# Patient Record
Sex: MALE | Race: WHITE | NOT HISPANIC OR LATINO | URBAN - METROPOLITAN AREA
[De-identification: names, ages, dates, MRNs, and addresses within clinical notes are randomized per-mention and may not be internally consistent; named-entity substitution may affect disease eponyms.]

---

## 2020-11-13 ENCOUNTER — WEB ENCOUNTER (OUTPATIENT)
Dept: URBAN - METROPOLITAN AREA CLINIC 35 | Facility: CLINIC | Age: 75
End: 2020-11-13

## 2020-11-16 ENCOUNTER — OFFICE VISIT (OUTPATIENT)
Dept: URBAN - METROPOLITAN AREA CLINIC 33 | Facility: CLINIC | Age: 75
End: 2020-11-16

## 2020-11-16 VITALS
WEIGHT: 200 LBS | SYSTOLIC BLOOD PRESSURE: 115 MMHG | DIASTOLIC BLOOD PRESSURE: 65 MMHG | BODY MASS INDEX: 24.87 KG/M2 | HEIGHT: 75 IN

## 2020-11-16 PROBLEM — 428283002 HISTORY OF POLYP OF COLON: Status: ACTIVE | Noted: 2020-11-16

## 2020-11-16 RX ORDER — FINASTERIDE 5 MG/1
1 TABLET TABLET, FILM COATED ORAL ONCE A DAY
Qty: 30 | Status: ACTIVE | COMMUNITY

## 2020-11-16 RX ORDER — AMLODIPINE BESYLATE 5 MG/1
1 TABLET TABLET ORAL ONCE A DAY
Qty: 30 | Status: ACTIVE | COMMUNITY

## 2020-11-16 RX ORDER — POLYETHYLENE GLYCOL 3350, SODIUM SULFATE ANHYDROUS, SODIUM BICARBONATE, SODIUM CHLORIDE, POTASSIUM CHLORIDE 236; 22.74; 6.74; 5.86; 2.97 G/4L; G/4L; G/4L; G/4L; G/4L
AS DIRECTED POWDER, FOR SOLUTION ORAL
Qty: 1 KIT | Refills: 0 | OUTPATIENT
Start: 2020-11-16

## 2020-11-16 RX ORDER — MELOXICAM 15 MG/1
1 TABLET TABLET ORAL ONCE A DAY
Qty: 30 | Status: ACTIVE | COMMUNITY

## 2020-11-16 RX ORDER — MULTIVITAMIN
1 TABLET TABLET ORAL ONCE A DAY
Qty: 30 | Status: ACTIVE | COMMUNITY

## 2020-11-16 RX ORDER — TAMSULOSIN HYDROCHLORIDE 0.4 MG/1
1 CAPSULE CAPSULE ORAL ONCE A DAY
Qty: 30 | Status: ACTIVE | COMMUNITY

## 2020-11-16 RX ORDER — ATORVASTATIN CALCIUM 20 MG/1
1 TABLET TABLET, FILM COATED ORAL ONCE A DAY
Qty: 30 | Status: ACTIVE | COMMUNITY

## 2020-11-16 NOTE — HPI-MIGRATED HPI
;     Surveillance Colonoscopy : 76 y/o male patient presents today for a consultation for a surveillance colonoscopy. Patient currently admits 1 formed bowel movements per day with no melena, mucus  or blood in stools.  Patient denies abdominal pain, bloating, gas, or  heartburn.  Patient admits having a colonoscopy in 2014 with normal findings. During the third of four past colonoscopies three (3) polyps were removed. Frequency of colonoscopy was reduced from 5 to 3 years.  Upon no findings on fourth colonoscopy, frequency changed back to 5. Patient admits that he was dianosed with diverticultitis.   Patient admits a family hx of colon, gastric, or esophageal cancer/polyps.  CT abd/pelvis (03/2019) showed atrophy of pancreas.   ;

## 2020-11-25 ENCOUNTER — OFFICE VISIT (OUTPATIENT)
Dept: URBAN - METROPOLITAN AREA SURGERY CENTER 8 | Facility: SURGERY CENTER | Age: 75
End: 2020-11-25

## 2020-12-02 ENCOUNTER — TELEPHONE ENCOUNTER (OUTPATIENT)
Dept: URBAN - METROPOLITAN AREA CLINIC 35 | Facility: CLINIC | Age: 75
End: 2020-12-02

## 2020-12-10 ENCOUNTER — OFFICE VISIT (OUTPATIENT)
Dept: URBAN - METROPOLITAN AREA CLINIC 35 | Facility: CLINIC | Age: 75
End: 2020-12-10

## 2020-12-10 VITALS
HEIGHT: 75 IN | BODY MASS INDEX: 25.24 KG/M2 | SYSTOLIC BLOOD PRESSURE: 138 MMHG | DIASTOLIC BLOOD PRESSURE: 80 MMHG | WEIGHT: 203 LBS

## 2020-12-10 PROBLEM — 398050005 DIVERTICULAR DISEASE OF COLON: Status: ACTIVE | Noted: 2020-12-10

## 2020-12-10 RX ORDER — MULTIVITAMIN
1 TABLET TABLET ORAL ONCE A DAY
Qty: 30 | Status: ACTIVE | COMMUNITY

## 2020-12-10 RX ORDER — MELOXICAM 15 MG/1
1 TABLET TABLET ORAL ONCE A DAY
Qty: 30 | Status: ACTIVE | COMMUNITY

## 2020-12-10 RX ORDER — TAMSULOSIN HYDROCHLORIDE 0.4 MG/1
1 CAPSULE CAPSULE ORAL ONCE A DAY
Qty: 30 | Status: ACTIVE | COMMUNITY

## 2020-12-10 RX ORDER — POLYETHYLENE GLYCOL 3350, SODIUM SULFATE ANHYDROUS, SODIUM BICARBONATE, SODIUM CHLORIDE, POTASSIUM CHLORIDE 236; 22.74; 6.74; 5.86; 2.97 G/4L; G/4L; G/4L; G/4L; G/4L
AS DIRECTED POWDER, FOR SOLUTION ORAL
Qty: 1 KIT | Refills: 0 | Status: ON HOLD | COMMUNITY
Start: 2020-11-16

## 2020-12-10 RX ORDER — ATORVASTATIN CALCIUM 20 MG/1
1 TABLET TABLET, FILM COATED ORAL ONCE A DAY
Qty: 30 | Status: ACTIVE | COMMUNITY

## 2020-12-10 RX ORDER — FINASTERIDE 5 MG/1
1 TABLET TABLET, FILM COATED ORAL ONCE A DAY
Qty: 30 | Status: ACTIVE | COMMUNITY

## 2020-12-10 RX ORDER — AMLODIPINE BESYLATE 5 MG/1
1 TABLET TABLET ORAL ONCE A DAY
Qty: 30 | Status: ACTIVE | COMMUNITY

## 2020-12-10 NOTE — HPI-MIGRATED HPI
;   ;     Follow Up : Patient presents today for a follow up regardin his US results.  Patient currently admits 1 formed bowel movements per day with no melena, mucus  or blood in stools.  Patient denies abdominal pain, bloating, gas, or  heartburn.   US Abd report (11/24/2020) shows: 1. Fatty liver with echogenic gallstone but no sonographic evidence for acute cholecystitis.  2. Mild bilateral renal hydronephrosis with 1.4 cm cystic focus within the interpolar region of the right kidney versus dilated calyx.    Colonoscopy report (11/25/2020) shows: Diverticulosis in the sigmoid colon and in the descending colon.  No specimens collected. ;   Surveillance Colonoscopy :     Last visit  (11/16/2020) 76 y/o male patient presents today for a consultation for a surveillance colonoscopy. Patient currently admits 1 formed bowel movements per day with no melena, mucus  or blood in stools.  Patient denies abdominal pain, bloating, gas, or  heartburn.  Patient admits having a colonoscopy in 2014 with normal findings. During the third of four past colonoscopies three (3) polyps were removed. Frequency of colonoscopy was reduced from 5 to 3 years.  Upon no findings on fourth colonoscopy, frequency changed back to 5. Patient admits that he was dianosed with diverticultitis.   Patient admits a family hx of colon, gastric, or esophageal cancer/polyps.  CT abd/pelvis (03/2019) showed atrophy of pancreas.   ;

## 2020-12-10 NOTE — HPI-MIGRATED HPI
;   ;     Colonoscopy Follow-Up : Patient presents today for follow up to his/her colonoscopy which was completed on (11/25/2020) by Dr. Juan Parks.  Patient admits/denies any complications after his/her procedure. Patient currently admits __ formed bowel movements per day.  Patient denies any melena, blood or mucus in stools.Patient denies any associated abdominal pain, bloating, or gas.   Colonoscopy report shows: ;   Surveillance Colonoscopy :    Last visit  (11/16/2020) 74 y/o male patient presents today for a consultation for a surveillance colonoscopy. Patient currently admits 1 formed bowel movements per day with no melena, mucus  or blood in stools.  Patient denies abdominal pain, bloating, gas, or  heartburn.  Patient admits having a colonoscopy in 2014 with normal findings. During the third of four past colonoscopies three (3) polyps were removed. Frequency of colonoscopy was reduced from 5 to 3 years.  Upon no findings on fourth colonoscopy, frequency changed back to 5. Patient admits that he was dianosed with diverticultitis.   Patient admits a family hx of colon, gastric, or esophageal cancer/polyps.  CT abd/pelvis (03/2019) showed atrophy of pancreas.   ;

## 2021-07-08 ENCOUNTER — OFFICE VISIT (OUTPATIENT)
Dept: URBAN - METROPOLITAN AREA CLINIC 35 | Facility: CLINIC | Age: 76
End: 2021-07-08

## 2021-07-08 ENCOUNTER — TELEPHONE ENCOUNTER (OUTPATIENT)
Dept: URBAN - METROPOLITAN AREA CLINIC 35 | Facility: CLINIC | Age: 76
End: 2021-07-08

## 2021-07-08 VITALS
WEIGHT: 194 LBS | HEIGHT: 75 IN | HEART RATE: 103 BPM | BODY MASS INDEX: 24.12 KG/M2 | OXYGEN SATURATION: 97 % | SYSTOLIC BLOOD PRESSURE: 120 MMHG | DIASTOLIC BLOOD PRESSURE: 68 MMHG

## 2021-07-08 RX ORDER — FINASTERIDE 5 MG/1
1 TABLET TABLET, FILM COATED ORAL ONCE A DAY
Qty: 30 | Status: ACTIVE | COMMUNITY

## 2021-07-08 RX ORDER — POLYETHYLENE GLYCOL 3350, SODIUM SULFATE ANHYDROUS, SODIUM BICARBONATE, SODIUM CHLORIDE, POTASSIUM CHLORIDE 236; 22.74; 6.74; 5.86; 2.97 G/4L; G/4L; G/4L; G/4L; G/4L
AS DIRECTED POWDER, FOR SOLUTION ORAL
Qty: 1 KIT | Refills: 0 | Status: ON HOLD | COMMUNITY
Start: 2020-11-16

## 2021-07-08 RX ORDER — MELOXICAM 15 MG/1
1 TABLET TABLET ORAL ONCE A DAY
Qty: 30 | Status: ACTIVE | COMMUNITY

## 2021-07-08 RX ORDER — TAMSULOSIN HYDROCHLORIDE 0.4 MG/1
1 CAPSULE CAPSULE ORAL ONCE A DAY
Qty: 30 | Status: ACTIVE | COMMUNITY

## 2021-07-08 RX ORDER — ATORVASTATIN CALCIUM 20 MG/1
1 TABLET TABLET, FILM COATED ORAL ONCE A DAY
Qty: 30 | Status: ACTIVE | COMMUNITY

## 2021-07-08 RX ORDER — MULTIVITAMIN
1 TABLET TABLET ORAL ONCE A DAY
Qty: 30 | Status: ACTIVE | COMMUNITY

## 2021-07-08 RX ORDER — AMLODIPINE BESYLATE 5 MG/1
1 TABLET TABLET ORAL ONCE A DAY
Qty: 30 | Status: ACTIVE | COMMUNITY

## 2021-07-08 NOTE — HPI-MIGRATED HPI
;   ;   ;     Colonoscopy Follow-Up :      (12/10/2020) Patient presents today for follow up to his colonoscopy which was completed on (11/25/2020) by Dr. Juan Parks.  Patient denies any complications after his procedure.  Patient currently admits 1 formed bowel movements per day with no melena, mucus  or blood in stools.  Patient denies abdominal pain, bloating, gas, or  heartburn.  ;   Surveillance Colonoscopy :        Last visit  (11/16/2020) 74 y/o male patient presents today for a consultation for a surveillance colonoscopy. Patient currently admits 1 formed bowel movements per day with no melena, mucus  or blood in stools.  Patient denies abdominal pain, bloating, gas, or  heartburn.  Patient admits having a colonoscopy in 2014 with normal findings. During the third of four past colonoscopies three (3) polyps were removed. Frequency of colonoscopy was reduced from 5 to 3 years.  Upon no findings on fourth colonoscopy, frequency changed back to 5. Patient admits that he was dianosed with diverticultitis.   Patient admits a family hx of colon, gastric, or esophageal cancer/polyps.  CT abd/pelvis (03/2019) showed atrophy of pancreas.;   Anemia : 75 y/o male patient presents today for a consultation regarding recent onset of anemia. Patient was first diagnosed after he had a hip surgery in (12/2020), and the most recent labs were taken  (06/15/2021).  Patient currently states that he is taking any iron supplements (ferrous sulfate) since (05/14/2021) and that they have/have not had a transfusion.  Patient denies  a history of GI bleeding, any blood or melena in stools, cold extremities, light headedness, dizziness.  Patient admits occasional usage of NSAIDs, epigastric/abdominal pains when laying down, and occasional fatigue  Patient admits 1 formed bowel movements without mucus, blood, or melena in stools. Patient does admit occasional 5-6 coffee ground like specs in stools.  Patient denies past EGD performed.  Last labs were done on (06/15/2021) Hgb L 12.4 MCH L 26.7 MCHC L 31.0 RDW H 21.6 Ferritin L 19 Iron saturation L 16 ;

## 2021-07-13 ENCOUNTER — OFFICE VISIT (OUTPATIENT)
Dept: URBAN - METROPOLITAN AREA SURGERY CENTER 8 | Facility: SURGERY CENTER | Age: 76
End: 2021-07-13

## 2021-07-22 ENCOUNTER — OFFICE VISIT (OUTPATIENT)
Dept: URBAN - METROPOLITAN AREA CLINIC 35 | Facility: CLINIC | Age: 76
End: 2021-07-22

## 2021-07-29 ENCOUNTER — TELEPHONE ENCOUNTER (OUTPATIENT)
Dept: URBAN - METROPOLITAN AREA CLINIC 35 | Facility: CLINIC | Age: 76
End: 2021-07-29

## 2021-07-29 ENCOUNTER — OFFICE VISIT (OUTPATIENT)
Dept: URBAN - METROPOLITAN AREA CLINIC 35 | Facility: CLINIC | Age: 76
End: 2021-07-29

## 2021-07-29 VITALS
HEIGHT: 75 IN | SYSTOLIC BLOOD PRESSURE: 135 MMHG | HEART RATE: 96 BPM | OXYGEN SATURATION: 96 % | BODY MASS INDEX: 24.25 KG/M2 | WEIGHT: 195 LBS | DIASTOLIC BLOOD PRESSURE: 80 MMHG

## 2021-07-29 PROBLEM — 87522002 IRON DEFICIENCY ANEMIA: Status: ACTIVE | Noted: 2021-07-09

## 2021-07-29 RX ORDER — ATORVASTATIN CALCIUM 20 MG/1
1 TABLET TABLET, FILM COATED ORAL ONCE A DAY
Qty: 30 | Status: ACTIVE | COMMUNITY

## 2021-07-29 RX ORDER — MULTIVITAMIN
1 TABLET TABLET ORAL ONCE A DAY
Qty: 30 | Status: ACTIVE | COMMUNITY

## 2021-07-29 RX ORDER — AMLODIPINE BESYLATE 5 MG/1
1 TABLET TABLET ORAL ONCE A DAY
Qty: 30 | Status: ACTIVE | COMMUNITY

## 2021-07-29 RX ORDER — POLYETHYLENE GLYCOL 3350, SODIUM SULFATE ANHYDROUS, SODIUM BICARBONATE, SODIUM CHLORIDE, POTASSIUM CHLORIDE 236; 22.74; 6.74; 5.86; 2.97 G/4L; G/4L; G/4L; G/4L; G/4L
AS DIRECTED POWDER, FOR SOLUTION ORAL
Qty: 1 KIT | Refills: 0 | Status: ON HOLD | COMMUNITY
Start: 2020-11-16

## 2021-07-29 RX ORDER — TAMSULOSIN HYDROCHLORIDE 0.4 MG/1
1 CAPSULE CAPSULE ORAL ONCE A DAY
Qty: 30 | Status: ACTIVE | COMMUNITY

## 2021-07-29 RX ORDER — FINASTERIDE 5 MG/1
1 TABLET TABLET, FILM COATED ORAL ONCE A DAY
Qty: 30 | Status: ACTIVE | COMMUNITY

## 2021-07-29 RX ORDER — MELOXICAM 15 MG/1
1 TABLET TABLET ORAL ONCE A DAY
Qty: 30 | Status: ACTIVE | COMMUNITY

## 2021-07-29 NOTE — HPI-MIGRATED HPI
;   ;   ;   ;     Follow up- EGD : Patient presents today for follow up to his  EGD which was completed on   (07/13/2021)  by Dr. Juan Parks.  Patient denies any complications after his procedure. Since the procedure patient denies dysphagia, heartburn, globus, changes in appetite, abdominal/epigastric pain or changes in bowel habits.   Patient states that he has been trying to be on a vegetarian diet.   EGD report shows:  1. Erythematous mucosa in the antrum and gastric body. Biopsied.  2. Erythematous mucosa in the esophagus. Biopsied.  3. 2 cm hiatal hernia.;   Colonoscopy Follow-Up : Last visit  (12/10/2020) Patient presents today for follow up to his colonoscopy which was completed on (11/25/2020) by Dr. Juan Parks.  Patient denies any complications after his procedure.  Patient currently admits 1 formed bowel movements per day with no melena, mucus  or blood in stools.  Patient denies abdominal pain, bloating, gas, or  heartburn.;   Surveillance Colonoscopy : Last visit  (11/16/2020) 76 y/o male patient presents today for a consultation for a surveillance colonoscopy. Patient currently admits 1 formed bowel movements per day with no melena, mucus  or blood in stools.  Patient denies abdominal pain, bloating, gas, or  heartburn.  Patient admits having a colonoscopy in 2014 with normal findings. During the third of four past colonoscopies three (3) polyps were removed. Frequency of colonoscopy was reduced from 5 to 3 years.  Upon no findings on fourth colonoscopy, frequency changed back to 5. Patient admits that he was dianosed with diverticultitis.   Patient admits a family hx of colon, gastric, or esophageal cancer/polyps.  CT abd/pelvis (03/2019) showed atrophy of pancreas.;   Anemia : Patient presents for a follow up of his anemia. Patient no longer experiences epigastric/abdominal pains. Patient denies any blood or melena in stools, cold extremities, light headedness, dizziness.    Patient continues to take iron supplements and states that he has been feeling more more energetic , less fatigue.  Patient states that he has been trying to be on a vegetarian diet.      Last visit (07/08/2021) 77 y/o male patient presents today for a consultation regarding recent onset of anemia. Patient was first diagnosed after he had a hip surgery in (12/2020), and the most recent labs were taken  (06/15/2021).  Patient currently states that he is taking any iron supplements (ferrous sulfate) since (05/14/2021) and that they have/have not had a transfusion.  Patient denies  a history of GI bleeding, any blood or melena in stools, cold extremities, light headedness, dizziness.  Patient admits occasional usage of NSAIDs, epigastric/abdominal pains when laying down, and occasional fatigue  Patient admits 1 formed bowel movements without mucus, blood, or melena in stools. Patient does admit occasional 5-6 coffee ground like specs in stools.  Patient denies past EGD performed.  Last labs were done on (06/15/2021) Hgb L 12.4 MCH L 26.7 MCHC L 31.0 RDW H 21.6 Ferritin L 19 Iron saturation L 16;

## 2021-07-30 ENCOUNTER — TELEPHONE ENCOUNTER (OUTPATIENT)
Dept: URBAN - METROPOLITAN AREA CLINIC 35 | Facility: CLINIC | Age: 76
End: 2021-07-30

## 2021-08-12 ENCOUNTER — OFFICE VISIT (OUTPATIENT)
Dept: URBAN - METROPOLITAN AREA CLINIC 35 | Facility: CLINIC | Age: 76
End: 2021-08-12

## 2021-08-20 ENCOUNTER — TELEPHONE ENCOUNTER (OUTPATIENT)
Dept: URBAN - METROPOLITAN AREA CLINIC 35 | Facility: CLINIC | Age: 76
End: 2021-08-20

## 2021-08-25 ENCOUNTER — OFFICE VISIT (OUTPATIENT)
Dept: URBAN - METROPOLITAN AREA CLINIC 35 | Facility: CLINIC | Age: 76
End: 2021-08-25

## 2021-08-25 ENCOUNTER — OFFICE VISIT (OUTPATIENT)
Dept: URBAN - METROPOLITAN AREA CLINIC 31 | Facility: CLINIC | Age: 76
End: 2021-08-25

## 2021-08-26 ENCOUNTER — OFFICE VISIT (OUTPATIENT)
Dept: URBAN - METROPOLITAN AREA CLINIC 35 | Facility: CLINIC | Age: 76
End: 2021-08-26

## 2021-08-31 ENCOUNTER — OFFICE VISIT (OUTPATIENT)
Dept: URBAN - METROPOLITAN AREA CLINIC 31 | Facility: CLINIC | Age: 76
End: 2021-08-31

## 2021-08-31 RX ORDER — AMLODIPINE BESYLATE 5 MG/1
1 TABLET TABLET ORAL ONCE A DAY
Qty: 30 | Status: ACTIVE | COMMUNITY

## 2021-08-31 RX ORDER — MULTIVITAMIN
1 TABLET TABLET ORAL ONCE A DAY
Qty: 30 | Status: ACTIVE | COMMUNITY

## 2021-08-31 RX ORDER — ATORVASTATIN CALCIUM 20 MG/1
1 TABLET TABLET, FILM COATED ORAL ONCE A DAY
Qty: 30 | Status: ACTIVE | COMMUNITY

## 2021-08-31 RX ORDER — POLYETHYLENE GLYCOL 3350, SODIUM SULFATE ANHYDROUS, SODIUM BICARBONATE, SODIUM CHLORIDE, POTASSIUM CHLORIDE 236; 22.74; 6.74; 5.86; 2.97 G/4L; G/4L; G/4L; G/4L; G/4L
AS DIRECTED POWDER, FOR SOLUTION ORAL
Qty: 1 KIT | Refills: 0 | Status: ON HOLD | COMMUNITY
Start: 2020-11-16

## 2021-08-31 RX ORDER — TAMSULOSIN HYDROCHLORIDE 0.4 MG/1
1 CAPSULE CAPSULE ORAL ONCE A DAY
Qty: 30 | Status: ACTIVE | COMMUNITY

## 2021-08-31 RX ORDER — MELOXICAM 15 MG/1
1 TABLET TABLET ORAL ONCE A DAY
Qty: 30 | Status: ACTIVE | COMMUNITY

## 2021-08-31 RX ORDER — FINASTERIDE 5 MG/1
1 TABLET TABLET, FILM COATED ORAL ONCE A DAY
Qty: 30 | Status: ACTIVE | COMMUNITY

## 2021-09-02 ENCOUNTER — OFFICE VISIT (OUTPATIENT)
Dept: URBAN - METROPOLITAN AREA CLINIC 35 | Facility: CLINIC | Age: 76
End: 2021-09-02

## 2021-09-06 ENCOUNTER — TELEPHONE ENCOUNTER (OUTPATIENT)
Dept: URBAN - METROPOLITAN AREA CLINIC 35 | Facility: CLINIC | Age: 76
End: 2021-09-06

## 2021-09-07 ENCOUNTER — TELEPHONE ENCOUNTER (OUTPATIENT)
Dept: URBAN - METROPOLITAN AREA CLINIC 35 | Facility: CLINIC | Age: 76
End: 2021-09-07

## 2021-09-09 ENCOUNTER — TELEPHONE ENCOUNTER (OUTPATIENT)
Dept: URBAN - METROPOLITAN AREA CLINIC 35 | Facility: CLINIC | Age: 76
End: 2021-09-09

## 2021-09-09 ENCOUNTER — OFFICE VISIT (OUTPATIENT)
Dept: URBAN - METROPOLITAN AREA CLINIC 35 | Facility: CLINIC | Age: 76
End: 2021-09-09

## 2021-09-09 VITALS
BODY MASS INDEX: 24.49 KG/M2 | DIASTOLIC BLOOD PRESSURE: 74 MMHG | SYSTOLIC BLOOD PRESSURE: 110 MMHG | OXYGEN SATURATION: 98 % | WEIGHT: 197 LBS | HEIGHT: 75 IN | HEART RATE: 95 BPM

## 2021-09-09 RX ORDER — ATORVASTATIN CALCIUM 20 MG/1
1 TABLET TABLET, FILM COATED ORAL ONCE A DAY
Qty: 30 | Status: ACTIVE | COMMUNITY

## 2021-09-09 RX ORDER — TAMSULOSIN HYDROCHLORIDE 0.4 MG/1
1 CAPSULE CAPSULE ORAL ONCE A DAY
Qty: 30 | Status: ACTIVE | COMMUNITY

## 2021-09-09 RX ORDER — AMLODIPINE BESYLATE 5 MG/1
1 TABLET TABLET ORAL ONCE A DAY
Qty: 30 | Status: ACTIVE | COMMUNITY

## 2021-09-09 RX ORDER — FINASTERIDE 5 MG/1
1 TABLET TABLET, FILM COATED ORAL ONCE A DAY
Qty: 30 | Status: ACTIVE | COMMUNITY

## 2021-09-09 RX ORDER — POLYETHYLENE GLYCOL 3350, SODIUM SULFATE ANHYDROUS, SODIUM BICARBONATE, SODIUM CHLORIDE, POTASSIUM CHLORIDE 236; 22.74; 6.74; 5.86; 2.97 G/4L; G/4L; G/4L; G/4L; G/4L
AS DIRECTED POWDER, FOR SOLUTION ORAL
Qty: 1 KIT | Refills: 0 | Status: ON HOLD | COMMUNITY
Start: 2020-11-16

## 2021-09-09 RX ORDER — MULTIVITAMIN
1 TABLET TABLET ORAL ONCE A DAY
Qty: 30 | Status: ACTIVE | COMMUNITY

## 2021-09-09 RX ORDER — MELOXICAM 15 MG/1
1 TABLET TABLET ORAL ONCE A DAY
Qty: 30 | Status: ACTIVE | COMMUNITY

## 2021-09-09 NOTE — HPI-MIGRATED HPI
;   ;     Follow up- EGD : (Last visit 7/29/2021) Patient presents today for follow up to his  EGD which was completed on   (07/13/2021)  by Dr. Juan Parks.  Patient denies any complications after his procedure. Since the procedure patient denies dysphagia, heartburn, globus, changes in appetite, abdominal/epigastric pain or changes in bowel habits.   Patient states that he has been trying to be on a vegetarian diet.   EGD report shows:  1. Erythematous mucosa in the antrum and gastric body. Biopsied.  2. Erythematous mucosa in the esophagus. Biopsied.  3. 2 cm hiatal hernia.;   Anemia : Patient presents for a follow up of his anemia. Patient no longer experiences epigastric/abdominal pains. Patient denies any blood or melena in stools, cold extremities, light headedness, dizziness.    Patient continues to take iron supplements and states that he has been feeling more more energetic, less fatigue.  Patient was previously sticking to a vegetarian diet, but has discontinued.     Pill cam (08/31/2021) AVMs and inflammation along with ulcers. Without any NSAIDs history, thie would be concnerning for possible Crohn's disease.      Last visit (07/29/2021) Patient presents for a follow up of his anemia. Patient no longer experiences epigastric/abdominal pains. Patient denies any blood or melena in stools, cold extremities, light headedness, dizziness.    Patient continues to take iron supplements and states that he has been feeling more more energetic , less fatigue.  Patient states that he has been trying to be on a vegetarian diet.      Last visit (07/08/2021) 77 y/o male patient presents today for a consultation regarding recent onset of anemia. Patient was first diagnosed after he had a hip surgery in (12/2020), and the most recent labs were taken  (06/15/2021).  Patient currently states that he is taking any iron supplements (ferrous sulfate) since (05/14/2021) and that they have/have not had a transfusion.  Patient denies  a history of GI bleeding, any blood or melena in stools, cold extremities, light headedness, dizziness.  Patient admits occasional usage of NSAIDs, epigastric/abdominal pains when laying down, and occasional fatigue  Patient admits 1 formed bowel movements without mucus, blood, or melena in stools. Patient does admit occasional 5-6 coffee ground like specs in stools.  Patient denies past EGD performed.  Last labs were done on (06/15/2021) Hgb L 12.4 MCH L 26.7 MCHC L 31.0 RDW H 21.6 Ferritin L 19 Iron saturation L 16;

## 2021-09-11 LAB
% SATURATION: 14
ABSOLUTE BASOPHILS: 33
ABSOLUTE EOSINOPHILS: 231
ABSOLUTE LYMPHOCYTES: 1597
ABSOLUTE MONOCYTES: 640
ABSOLUTE NEUTROPHILS: 4099
BASOPHILS: 0.5
EOSINOPHILS: 3.5
FERRITIN: 13
HEMATOCRIT: 43.8
HEMOGLOBIN: 14.3
IRON BINDING CAPACITY: 400
IRON, TOTAL: 54
LYMPHOCYTES: 24.2
MCH: 29.2
MCHC: 32.6
MCV: 89.4
MONOCYTES: 9.7
MPV: 10.8
NEUTROPHILS: 62.1
PLATELET COUNT: 276
RDW: 13.7
RED BLOOD CELL COUNT: 4.9
WHITE BLOOD CELL COUNT: 6.6

## 2021-11-02 ENCOUNTER — TELEPHONE ENCOUNTER (OUTPATIENT)
Dept: URBAN - METROPOLITAN AREA CLINIC 35 | Facility: CLINIC | Age: 76
End: 2021-11-02

## 2021-11-02 PROBLEM — 197321007 FATTY LIVER: Status: ACTIVE | Noted: 2020-12-10

## 2021-11-04 ENCOUNTER — OFFICE VISIT (OUTPATIENT)
Dept: URBAN - METROPOLITAN AREA CLINIC 35 | Facility: CLINIC | Age: 76
End: 2021-11-04

## 2021-12-10 ENCOUNTER — TELEPHONE ENCOUNTER (OUTPATIENT)
Dept: URBAN - METROPOLITAN AREA CLINIC 36 | Facility: CLINIC | Age: 76
End: 2021-12-10

## 2022-01-06 ENCOUNTER — OFFICE VISIT (OUTPATIENT)
Dept: URBAN - METROPOLITAN AREA CLINIC 35 | Facility: CLINIC | Age: 77
End: 2022-01-06

## 2022-01-27 ENCOUNTER — LAB OUTSIDE AN ENCOUNTER (OUTPATIENT)
Dept: URBAN - METROPOLITAN AREA CLINIC 35 | Facility: CLINIC | Age: 77
End: 2022-01-27

## 2022-01-27 ENCOUNTER — OFFICE VISIT (OUTPATIENT)
Dept: URBAN - METROPOLITAN AREA CLINIC 35 | Facility: CLINIC | Age: 77
End: 2022-01-27
Payer: MEDICARE

## 2022-01-27 VITALS
WEIGHT: 205 LBS | OXYGEN SATURATION: 95 % | DIASTOLIC BLOOD PRESSURE: 80 MMHG | SYSTOLIC BLOOD PRESSURE: 135 MMHG | HEIGHT: 75 IN | HEART RATE: 105 BPM | BODY MASS INDEX: 25.49 KG/M2

## 2022-01-27 DIAGNOSIS — D50.9 IRON DEFICIENCY ANEMIA, UNSPECIFIED IRON DEFICIENCY ANEMIA TYPE: ICD-10-CM

## 2022-01-27 DIAGNOSIS — K76.0 FATTY LIVER: ICD-10-CM

## 2022-01-27 DIAGNOSIS — R93.89 ABNORMAL ULTRASOUND: ICD-10-CM

## 2022-01-27 PROBLEM — 197321007: Status: ACTIVE | Noted: 2022-01-27

## 2022-01-27 PROBLEM — 169255008: Status: ACTIVE | Noted: 2022-01-27

## 2022-01-27 PROCEDURE — 99213 OFFICE O/P EST LOW 20 MIN: CPT | Performed by: INTERNAL MEDICINE

## 2022-01-27 RX ORDER — AMLODIPINE BESYLATE 5 MG/1
1 TABLET TABLET ORAL ONCE A DAY
Qty: 30 | Status: ACTIVE | COMMUNITY

## 2022-01-27 RX ORDER — TAMSULOSIN HYDROCHLORIDE 0.4 MG/1
1 CAPSULE CAPSULE ORAL ONCE A DAY
Qty: 30 | Status: ACTIVE | COMMUNITY

## 2022-01-27 RX ORDER — MULTIVITAMIN
1 TABLET TABLET ORAL ONCE A DAY
Qty: 30 | Status: ACTIVE | COMMUNITY

## 2022-01-27 RX ORDER — ATORVASTATIN CALCIUM 20 MG/1
1 TABLET TABLET, FILM COATED ORAL ONCE A DAY
Qty: 30 | Status: ACTIVE | COMMUNITY

## 2022-01-27 RX ORDER — FINASTERIDE 5 MG/1
1 TABLET TABLET, FILM COATED ORAL ONCE A DAY
Qty: 30 | Status: ACTIVE | COMMUNITY

## 2022-01-27 RX ORDER — MELOXICAM 15 MG/1
1 TABLET TABLET ORAL ONCE A DAY
Qty: 30 | Status: ACTIVE | COMMUNITY

## 2022-01-27 RX ORDER — POLYETHYLENE GLYCOL 3350, SODIUM SULFATE ANHYDROUS, SODIUM BICARBONATE, SODIUM CHLORIDE, POTASSIUM CHLORIDE 236; 22.74; 6.74; 5.86; 2.97 G/4L; G/4L; G/4L; G/4L; G/4L
AS DIRECTED POWDER, FOR SOLUTION ORAL
Qty: 1 KIT | Refills: 0 | Status: ON HOLD | COMMUNITY
Start: 2020-11-16

## 2022-01-27 RX ORDER — OMEPRAZOLE 40 MG/1
1 CAPSULE 30 MINUTES BEFORE MORNING MEAL CAPSULE, DELAYED RELEASE ORAL ONCE A DAY
Qty: 30 | Refills: 11 | OUTPATIENT
Start: 2022-01-27

## 2022-01-27 NOTE — HPI-FOLLOW UP VISIT
Follow up- EGD : (Last visit 7/29/2021) Patient presents today for follow up to his  EGD which was completed on   (07/13/2021)  by Dr. Juan Parks.  Patient denies any complications after his procedure. Since the procedure patient denies dysphagia, heartburn, globus, changes in appetite, abdominal/epigastric pain or changes in bowel habits.   Patient states that he has been trying to be on a vegetarian diet.   EGD report shows:  1. Erythematous mucosa in the antrum and gastric body. Biopsied.  2. Erythematous mucosa in the esophagus. Biopsied.  3. 2 cm hiatal hernia.

## 2022-01-27 NOTE — HPI-ANEMIA
Patient presents for a follow up of his anemia.   Patient no longer experiences epigastric/abdominal pains. Patient admits 1 dark , soft and wet stools daily.   Patient denies any blood or melena in stools, cold extremities, light headedness, dizziness.    Patient continues  to take iron supplements and states that he has been feeling more more energetic, less fatigue.  Patient was previously sticking to a vegetarian diet, but has discontinued.   US done on 12/20/2021 1. Borderline heptomegaly. Areas of questionable mild hepatic surface nodularity/nobularity, raising suspicion for possible chronic liver disease. 2. Cholelithiasis.     Last visit (09/09/2021)   Anemia : Patient presents for a follow up of his anemia. Patient no longer experiences epigastric/abdominal pains. Patient denies any blood or melena in stools, cold extremities, light headedness, dizziness.    Patient continues to take iron supplements and states that he has been feeling more more energetic, less fatigue.  Patient was previously sticking to a vegetarian diet, but has discontinued.   Pill cam done on (08/31/2021) AVMs and inflammation along with ulcers. Without any NSAIDs history, thie would be concnerning for possible Crohn's disease.       Last visit (07/29/2021) Patient presents for a follow up of his anemia. Patient no longer experiences epigastric/abdominal pains. Patient denies any blood or melena in stools, cold extremities, light headedness, dizziness.    Patient continues to take iron supplements and states that he has been feeling more more energetic , less fatigue.  Patient states that he has been trying to be on a vegetarian diet.     Last visit (07/08/2021) 75 y/o male patient presents today for a consultation regarding recent onset of anemia. Patient was first diagnosed after he had a hip surgery in (12/2020), and the most recent labs were taken  (06/15/2021).  Patient currently states that he is taking any iron supplements (ferrous sulfate) since (05/14/2021) and that they have/have not had a transfusion.  Patient denies  a history of GI bleeding, any blood or melena in stools, cold extremities, light headedness, dizziness.  Patient admits occasional usage of NSAIDs, epigastric/abdominal pains when laying down, and occasional fatigue  Patient admits 1 formed bowel movements without mucus, blood, or melena in stools. Patient does admit occasional 5-6 coffee ground like specs in stools.  Patient denies past EGD performed.  Last labs were done on (06/15/2021) Hgb L 12.4 MCH L 26.7 MCHC L 31.0 RDW H 21.6 Ferritin L 19 Iron saturation L 16

## 2022-03-03 ENCOUNTER — TELEPHONE ENCOUNTER (OUTPATIENT)
Dept: URBAN - METROPOLITAN AREA CLINIC 35 | Facility: CLINIC | Age: 77
End: 2022-03-03

## 2022-03-04 ENCOUNTER — OFFICE VISIT (OUTPATIENT)
Dept: URBAN - METROPOLITAN AREA CLINIC 33 | Facility: CLINIC | Age: 77
End: 2022-03-04
Payer: MEDICARE

## 2022-03-04 VITALS
HEART RATE: 100 BPM | HEIGHT: 75 IN | SYSTOLIC BLOOD PRESSURE: 148 MMHG | DIASTOLIC BLOOD PRESSURE: 92 MMHG | OXYGEN SATURATION: 97 % | WEIGHT: 208 LBS | BODY MASS INDEX: 25.86 KG/M2

## 2022-03-04 DIAGNOSIS — D50.9 IRON DEFICIENCY ANEMIA, UNSPECIFIED IRON DEFICIENCY ANEMIA TYPE: ICD-10-CM

## 2022-03-04 DIAGNOSIS — R93.5 ABNORMAL ULTRASOUND OF ABDOMEN: ICD-10-CM

## 2022-03-04 PROCEDURE — 99213 OFFICE O/P EST LOW 20 MIN: CPT | Performed by: INTERNAL MEDICINE

## 2022-03-04 RX ORDER — FINASTERIDE 5 MG/1
1 TABLET TABLET, FILM COATED ORAL ONCE A DAY
Qty: 30 | Status: ACTIVE | COMMUNITY

## 2022-03-04 RX ORDER — AMLODIPINE BESYLATE 5 MG/1
1 TABLET TABLET ORAL ONCE A DAY
Qty: 30 | Status: ACTIVE | COMMUNITY

## 2022-03-04 RX ORDER — OMEPRAZOLE 40 MG/1
1 CAPSULE 30 MINUTES BEFORE MORNING MEAL CAPSULE, DELAYED RELEASE ORAL ONCE A DAY
Qty: 30 | Refills: 11 | Status: ACTIVE | COMMUNITY
Start: 2022-01-27

## 2022-03-04 RX ORDER — ATORVASTATIN CALCIUM 20 MG/1
1 TABLET TABLET, FILM COATED ORAL ONCE A DAY
Qty: 30 | Status: ACTIVE | COMMUNITY

## 2022-03-04 RX ORDER — TAMSULOSIN HYDROCHLORIDE 0.4 MG/1
1 CAPSULE CAPSULE ORAL ONCE A DAY
Qty: 30 | Status: ACTIVE | COMMUNITY

## 2022-03-04 RX ORDER — MELOXICAM 15 MG/1
1 TABLET TABLET ORAL ONCE A DAY
Qty: 30 | Status: ACTIVE | COMMUNITY

## 2022-03-04 RX ORDER — MULTIVITAMIN
1 TABLET TABLET ORAL ONCE A DAY
Qty: 30 | Status: ACTIVE | COMMUNITY

## 2022-03-04 RX ORDER — POLYETHYLENE GLYCOL 3350, SODIUM SULFATE ANHYDROUS, SODIUM BICARBONATE, SODIUM CHLORIDE, POTASSIUM CHLORIDE 236; 22.74; 6.74; 5.86; 2.97 G/4L; G/4L; G/4L; G/4L; G/4L
AS DIRECTED POWDER, FOR SOLUTION ORAL
Qty: 1 KIT | Refills: 0 | Status: ON HOLD | COMMUNITY
Start: 2020-11-16

## 2022-03-04 NOTE — PHYSICAL EXAM MUSCULOSKELETAL:
Impression: Age-related nuclear cataract, bilateral: H25.13. Plan:  Discussed cataract diagnosis with the patient. Discussed and reviewed treatment options for cataracts. Risks and benefits of surgical treatment were discussed and understood. Patient elects surgical treatment. Recommend surgery OU, OD first.
--Plan near aim OD, distance aim OS. Pt. currently has monovision. --Discussed ERM may limit BCVA p sx. Normal gait and station, no tenderness or deformities present

## 2022-03-04 NOTE — HPI-ANEMIA
78 y/o male patient presents today for a follow up of his anemia and recent MRCP results.  Patient no longer experiences epigastric/abdominal pains. Patient admits 1 dark , soft and wet stools daily. Patient denies any blood or melena in stools, cold extremities, lightheadedness, dizziness.    Patient continues to take ferrous sulfate and Omeprazole 40 MG and states that he has been feeling more more energetic and less fatigued. Patient was previously sticking to a vegetarian diet, but has discontinued.   Patient was recommended to take Meloxicam 15 MG every 5 days upon recommendation by . He has been sticking to this guideline and notices improvement of his symptoms.   Patient admits to recent MRCP at [facility] on 02/21/2022, which reflected findings of: 1. Mild hepatic steatosis 2. Cholelithiasis without cholecystitis or ductal dilation  Patient admits to most recent labs performed at PCP office: Hgb 16.4 MCH 31.7 MCHC 32.9 RDW 13.6 Ferritin 53 Iron saturation 41 Iron level 144       Last visit (01/27/2022):  Patient presents for a follow up of his anemia.   Patient no longer experiences epigastric/abdominal pains. Patient admits 1 dark , soft and wet stools daily.   Patient denies any blood or melena in stools, cold extremities, light headedness, dizziness.    Patient continues  to take iron supplements and states that he has been feeling more more energetic, less fatigue.  Patient was previously sticking to a vegetarian diet, but has discontinued.   US done on 12/20/2021 1. Borderline heptomegaly. Areas of questionable mild hepatic surface nodularity/nobularity, raising suspicion for possible chronic liver disease. 2. Cholelithiasis.     Last visit (09/09/2021)    Anemia : Patient presents for a follow up of his anemia. Patient no longer experiences epigastric/abdominal pains. Patient denies any blood or melena in stools, cold extremities, light headedness, dizziness.    Patient continues to take iron supplements and states that he has been feeling more more energetic, less fatigue.  Patient was previously sticking to a vegetarian diet, but has discontinued.   Pill cam done on (08/31/2021) AVMs and inflammation along with ulcers. Without any NSAIDs history, thie would be concnerning for possible Crohn's disease.       Last visit (07/29/2021) Patient presents for a follow up of his anemia. Patient no longer experiences epigastric/abdominal pains. Patient denies any blood or melena in stools, cold extremities, light headedness, dizziness.    Patient continues to take iron supplements and states that he has been feeling more more energetic , less fatigue.  Patient states that he has been trying to be on a vegetarian diet.     Last visit (07/08/2021) 75 y/o male patient presents today for a consultation regarding recent onset of anemia. Patient was first diagnosed after he had a hip surgery in (12/2020), and the most recent labs were taken  (06/15/2021).  Patient currently states that he is taking any iron supplements (ferrous sulfate) since (05/14/2021) and that they have/have not had a transfusion.  Patient denies  a history of GI bleeding, any blood or melena in stools, cold extremities, light headedness, dizziness.  Patient admits occasional usage of NSAIDs, epigastric/abdominal pains when laying down, and occasional fatigue  Patient admits 1 formed bowel movements without mucus, blood, or melena in stools. Patient does admit occasional 5-6 coffee ground like specs in stools.  Patient denies past EGD performed.  Last labs were done on (06/15/2021) Hgb L 12.4 MCH L 26.7 MCHC L 31.0 RDW H 21.6 Ferritin L 19 Iron saturation L 16

## 2022-03-06 PROBLEM — 87522002: Status: ACTIVE | Noted: 2022-01-27

## 2022-09-21 ENCOUNTER — WEB ENCOUNTER (OUTPATIENT)
Dept: URBAN - METROPOLITAN AREA CLINIC 35 | Facility: CLINIC | Age: 77
End: 2022-09-21

## 2022-09-22 ENCOUNTER — OFFICE VISIT (OUTPATIENT)
Dept: URBAN - METROPOLITAN AREA CLINIC 35 | Facility: CLINIC | Age: 77
End: 2022-09-22
Payer: MEDICARE

## 2022-09-22 VITALS
HEIGHT: 75 IN | WEIGHT: 194 LBS | SYSTOLIC BLOOD PRESSURE: 126 MMHG | BODY MASS INDEX: 24.12 KG/M2 | OXYGEN SATURATION: 95 % | DIASTOLIC BLOOD PRESSURE: 74 MMHG | HEART RATE: 78 BPM

## 2022-09-22 DIAGNOSIS — D50.8 OTHER IRON DEFICIENCY ANEMIA: ICD-10-CM

## 2022-09-22 PROCEDURE — 99213 OFFICE O/P EST LOW 20 MIN: CPT | Performed by: INTERNAL MEDICINE

## 2022-09-22 RX ORDER — TAMSULOSIN HYDROCHLORIDE 0.4 MG/1
1 CAPSULE CAPSULE ORAL ONCE A DAY
Qty: 30 | Status: ACTIVE | COMMUNITY

## 2022-09-22 RX ORDER — ATORVASTATIN CALCIUM 20 MG/1
1 TABLET TABLET, FILM COATED ORAL ONCE A DAY
Qty: 30 | Status: ACTIVE | COMMUNITY

## 2022-09-22 RX ORDER — FINASTERIDE 5 MG/1
1 TABLET TABLET, FILM COATED ORAL ONCE A DAY
Qty: 30 | Status: ACTIVE | COMMUNITY

## 2022-09-22 RX ORDER — OMEPRAZOLE 40 MG/1
1 CAPSULE 30 MINUTES BEFORE MORNING MEAL CAPSULE, DELAYED RELEASE ORAL ONCE A DAY
Qty: 30 | Refills: 11 | Status: ACTIVE | COMMUNITY
Start: 2022-01-27

## 2022-09-22 RX ORDER — MULTIVITAMIN
1 TABLET TABLET ORAL ONCE A DAY
Qty: 30 | Status: ACTIVE | COMMUNITY

## 2022-09-22 RX ORDER — POLYETHYLENE GLYCOL 3350, SODIUM SULFATE ANHYDROUS, SODIUM BICARBONATE, SODIUM CHLORIDE, POTASSIUM CHLORIDE 236; 22.74; 6.74; 5.86; 2.97 G/4L; G/4L; G/4L; G/4L; G/4L
AS DIRECTED POWDER, FOR SOLUTION ORAL
Qty: 1 KIT | Refills: 0 | Status: ON HOLD | COMMUNITY
Start: 2020-11-16

## 2022-09-22 RX ORDER — AMLODIPINE BESYLATE 5 MG/1
1 TABLET TABLET ORAL ONCE A DAY
Qty: 30 | Status: ACTIVE | COMMUNITY

## 2022-09-22 RX ORDER — MELOXICAM 15 MG/1
1 TABLET TABLET ORAL ONCE A DAY
Qty: 30 | Status: ACTIVE | COMMUNITY

## 2022-09-22 NOTE — HPI-ANEMIA
77 year old male patient presents today for a follow up of his anemia. Patient currently denies feeling any dizziness or fatigue. He states that he is currently recovering from his right hip replacement in July 2022.   Patient states that he discotinued taking Ferrous Sulfate upon the advice of his primary care physician. Patient currently admits 1 formed bowel movement every other day without blood, mucus, or melena.   Patient had his last iron levels tested in March and has not had any recent labs performed.                Last visit (3/4/2022): 78 y/o male patient presents today for a follow up of his anemia and recent MRCP results.  Patient no longer experiences epigastric/abdominal pains. Patient admits 1 dark , soft and wet stools daily. Patient denies any blood or melena in stools, cold extremities, lightheadedness, dizziness.    Patient continues to take ferrous sulfate and Omeprazole 40 MG and states that he has been feeling more more energetic and less fatigued. Patient was previously sticking to a vegetarian diet, but has discontinued.   Patient was recommended to take Meloxicam 15 MG every 5 days upon recommendation by . He has been sticking to this guideline and notices improvement of his symptoms.   Patient admits to recent MRCP at [facility] on 02/21/2022, which reflected findings of: 1. Mild hepatic steatosis 2. Cholelithiasis without cholecystitis or ductal dilation  Patient admits to most recent labs performed at PCP office: Hgb 16.4 MCH 31.7 MCHC 32.9 RDW 13.6 Ferritin 53 Iron saturation 41 Iron level 144

## 2022-09-29 PROBLEM — 87522002: Status: ACTIVE | Noted: 2022-09-29

## 2023-05-09 ENCOUNTER — WEB ENCOUNTER (OUTPATIENT)
Dept: URBAN - METROPOLITAN AREA CLINIC 23 | Facility: CLINIC | Age: 78
End: 2023-05-09

## 2023-05-09 RX ORDER — OMEPRAZOLE 40 MG/1
1 CAPSULE 30 MINUTES BEFORE MORNING MEAL CAPSULE, DELAYED RELEASE ORAL ONCE A DAY
Qty: 30 | Refills: 11 | OUTPATIENT
Start: 2023-05-10

## 2023-06-05 ENCOUNTER — ERX REFILL RESPONSE (OUTPATIENT)
Dept: URBAN - METROPOLITAN AREA CLINIC 35 | Facility: CLINIC | Age: 78
End: 2023-06-05

## 2023-06-05 RX ORDER — OMEPRAZOLE 40 MG/1
1 CAPSULE 30 MINUTES BEFORE MORNING MEAL CAPSULE, DELAYED RELEASE ORAL ONCE A DAY
Qty: 30 | Refills: 11 | OUTPATIENT

## 2023-06-05 RX ORDER — OMEPRAZOLE 40 MG/1
TAKE 1 CAPSULE BY MOUTH EVERY DAY 30 MINUTES BEFORE BREAKFAST CAPSULE, DELAYED RELEASE ORAL
Qty: 30 CAPSULE | Refills: 0 | OUTPATIENT

## 2023-06-07 ENCOUNTER — ERX REFILL RESPONSE (OUTPATIENT)
Dept: URBAN - METROPOLITAN AREA CLINIC 35 | Facility: CLINIC | Age: 78
End: 2023-06-07

## 2023-06-07 RX ORDER — OMEPRAZOLE 40 MG/1
TAKE 1 CAPSULE BY MOUTH EVERY DAY 30 MINUTES BEFORE BREAKFAST CAPSULE, DELAYED RELEASE ORAL
Qty: 90 CAPSULE | Refills: 0 | OUTPATIENT

## 2023-06-07 RX ORDER — OMEPRAZOLE 40 MG/1
TAKE 1 CAPSULE BY MOUTH EVERY DAY 30 MINUTES BEFORE BREAKFAST CAPSULE, DELAYED RELEASE ORAL
Qty: 30 CAPSULE | Refills: 0 | OUTPATIENT

## 2024-02-12 ENCOUNTER — LAB (OUTPATIENT)
Dept: URBAN - METROPOLITAN AREA CLINIC 78 | Facility: CLINIC | Age: 79
End: 2024-02-12

## 2024-02-12 ENCOUNTER — OV EP (OUTPATIENT)
Dept: URBAN - METROPOLITAN AREA CLINIC 78 | Facility: CLINIC | Age: 79
End: 2024-02-12
Payer: MEDICARE

## 2024-02-12 VITALS
SYSTOLIC BLOOD PRESSURE: 126 MMHG | TEMPERATURE: 98 F | HEART RATE: 108 BPM | WEIGHT: 212.6 LBS | HEIGHT: 75 IN | DIASTOLIC BLOOD PRESSURE: 83 MMHG | BODY MASS INDEX: 26.43 KG/M2

## 2024-02-12 DIAGNOSIS — Z86.010 PERSONAL HISTORY OF COLONIC POLYPS: ICD-10-CM

## 2024-02-12 DIAGNOSIS — Z12.11 COLON CANCER SCREENING: ICD-10-CM

## 2024-02-12 DIAGNOSIS — Z86.2 HISTORY OF ANEMIA: ICD-10-CM

## 2024-02-12 DIAGNOSIS — Z80.0 FAMILY HISTORY OF COLON CANCER: ICD-10-CM

## 2024-02-12 DIAGNOSIS — K80.20 GALLSTONES: ICD-10-CM

## 2024-02-12 PROBLEM — 275538002: Status: ACTIVE | Noted: 2024-02-12

## 2024-02-12 PROCEDURE — 99214 OFFICE O/P EST MOD 30 MIN: CPT | Performed by: INTERNAL MEDICINE

## 2024-02-12 RX ORDER — ATORVASTATIN CALCIUM 20 MG/1
1 TABLET TABLET, FILM COATED ORAL ONCE A DAY
Qty: 30 | Status: ACTIVE | COMMUNITY

## 2024-02-12 RX ORDER — TAMSULOSIN HYDROCHLORIDE 0.4 MG/1
1 CAPSULE CAPSULE ORAL ONCE A DAY
Qty: 30 | Status: ACTIVE | COMMUNITY

## 2024-02-12 RX ORDER — AMLODIPINE BESYLATE 5 MG/1
1 TABLET TABLET ORAL ONCE A DAY
Qty: 30 | Status: ACTIVE | COMMUNITY

## 2024-02-12 RX ORDER — POLYETHYLENE GLYCOL 3350, SODIUM SULFATE ANHYDROUS, SODIUM BICARBONATE, SODIUM CHLORIDE, POTASSIUM CHLORIDE 236; 22.74; 6.74; 5.86; 2.97 G/4L; G/4L; G/4L; G/4L; G/4L
AS DIRECTED POWDER, FOR SOLUTION ORAL
Qty: 1 KIT | Refills: 0 | Status: ON HOLD | COMMUNITY
Start: 2020-11-16

## 2024-02-12 RX ORDER — FINASTERIDE 5 MG/1
1 TABLET TABLET, FILM COATED ORAL ONCE A DAY
Qty: 30 | Status: ACTIVE | COMMUNITY

## 2024-02-12 RX ORDER — MELOXICAM 15 MG/1
1 TABLET TABLET ORAL ONCE A DAY
Qty: 30 | Status: ACTIVE | COMMUNITY

## 2024-02-12 RX ORDER — MULTIVITAMIN
1 TABLET TABLET ORAL ONCE A DAY
Qty: 30 | Status: ACTIVE | COMMUNITY

## 2024-02-12 RX ORDER — OMEPRAZOLE 40 MG/1
TAKE 1 CAPSULE BY MOUTH EVERY DAY 30 MINUTES BEFORE BREAKFAST CAPSULE, DELAYED RELEASE ORAL
Qty: 90 CAPSULE | Refills: 0 | Status: ACTIVE | COMMUNITY

## 2024-02-12 NOTE — HPI-TODAY'S VISIT:
Patient had a Colonoscopy in 2017 He was found to have 3 polyps His f/u colon in 2020 was wnl. He has [facility]CC +++ He was evaluated for anemia in 2021 A SB PILLCAM study in 2021 showed SB AVMs, inflmmation and ulcerations. This was apparently 2/2 long term use of Melxicam. He wwas treated with PPIs and Iron infusion Follow up labs showed resolution of his anemia He has never had anemia since then He has had an MRCP in 2022 - it showed gallstones.

## 2024-02-29 ENCOUNTER — COLON (OUTPATIENT)
Dept: URBAN - METROPOLITAN AREA SURGERY CENTER 15 | Facility: SURGERY CENTER | Age: 79
End: 2024-02-29

## 2024-03-19 ENCOUNTER — COLON (OUTPATIENT)
Dept: URBAN - METROPOLITAN AREA SURGERY CENTER 15 | Facility: SURGERY CENTER | Age: 79
End: 2024-03-19
Payer: MEDICARE

## 2024-03-19 ENCOUNTER — LAB (OUTPATIENT)
Dept: URBAN - METROPOLITAN AREA CLINIC 4 | Facility: CLINIC | Age: 79
End: 2024-03-19
Payer: MEDICARE

## 2024-03-19 DIAGNOSIS — Z86.010 ADENOMAS PERSONAL HISTORY OF COLONIC POLYPS: ICD-10-CM

## 2024-03-19 DIAGNOSIS — K63.89 OTHER SPECIFIED DISEASES OF INTESTINE: ICD-10-CM

## 2024-03-19 PROCEDURE — 88305 TISSUE EXAM BY PATHOLOGIST: CPT | Performed by: PATHOLOGY

## 2024-03-19 PROCEDURE — 45380 COLONOSCOPY AND BIOPSY: CPT | Performed by: INTERNAL MEDICINE
